# Patient Record
Sex: MALE | Race: BLACK OR AFRICAN AMERICAN | NOT HISPANIC OR LATINO | URBAN - METROPOLITAN AREA
[De-identification: names, ages, dates, MRNs, and addresses within clinical notes are randomized per-mention and may not be internally consistent; named-entity substitution may affect disease eponyms.]

---

## 2023-06-28 ENCOUNTER — OFFICE VISIT (OUTPATIENT)
Dept: URGENT CARE | Facility: URGENT CARE | Age: 33
End: 2023-06-28
Payer: COMMERCIAL

## 2023-06-28 VITALS
DIASTOLIC BLOOD PRESSURE: 89 MMHG | WEIGHT: 208 LBS | SYSTOLIC BLOOD PRESSURE: 130 MMHG | TEMPERATURE: 98 F | OXYGEN SATURATION: 98 % | RESPIRATION RATE: 17 BRPM | HEART RATE: 86 BPM

## 2023-06-28 DIAGNOSIS — J30.2 SEASONAL ALLERGIC RHINITIS, UNSPECIFIED TRIGGER: ICD-10-CM

## 2023-06-28 DIAGNOSIS — H10.503 BLEPHAROCONJUNCTIVITIS OF BOTH EYES, UNSPECIFIED BLEPHAROCONJUNCTIVITIS TYPE: Primary | ICD-10-CM

## 2023-06-28 PROCEDURE — 99203 OFFICE O/P NEW LOW 30 MIN: CPT | Performed by: EMERGENCY MEDICINE

## 2023-06-28 RX ORDER — OLOPATADINE HYDROCHLORIDE 1 MG/ML
1 SOLUTION/ DROPS OPHTHALMIC 2 TIMES DAILY
Qty: 1 ML | Refills: 0 | Status: SHIPPED | OUTPATIENT
Start: 2023-06-28 | End: 2023-07-05

## 2023-06-28 RX ORDER — QUETIAPINE FUMARATE 200 MG/1
TABLET, FILM COATED ORAL
COMMUNITY
Start: 2023-06-28

## 2023-06-28 RX ORDER — AZELASTINE 1 MG/ML
1 SPRAY, METERED NASAL 2 TIMES DAILY
Qty: 30 ML | Refills: 0 | Status: SHIPPED | OUTPATIENT
Start: 2023-06-28

## 2023-06-28 RX ORDER — FOLIC ACID/MV,IRON,MIN/LUTEIN 0.4-18-25
TABLET ORAL
COMMUNITY
Start: 2023-06-28

## 2023-06-28 RX ORDER — FOLIC ACID 1 MG/1
TABLET ORAL
COMMUNITY
Start: 2023-06-28

## 2023-06-28 RX ORDER — ERYTHROMYCIN 5 MG/G
0.5 OINTMENT OPHTHALMIC 4 TIMES DAILY
Qty: 3.5 G | Refills: 0 | Status: SHIPPED | OUTPATIENT
Start: 2023-06-28 | End: 2023-07-03

## 2023-06-28 RX ORDER — LANOLIN ALCOHOL/MO/W.PET/CERES
CREAM (GRAM) TOPICAL
COMMUNITY
Start: 2023-06-28

## 2023-06-28 RX ORDER — HYDROXYZINE HYDROCHLORIDE 50 MG/1
TABLET, FILM COATED ORAL
COMMUNITY
Start: 2023-06-28

## 2023-06-28 RX ORDER — PRAZOSIN HYDROCHLORIDE 2 MG/1
CAPSULE ORAL
COMMUNITY
Start: 2023-06-28

## 2023-06-29 NOTE — PROGRESS NOTES
Assessment & Plan     Diagnosis:    ICD-10-CM    1. Blepharoconjunctivitis of both eyes, unspecified blepharoconjunctivitis type  H10.503 olopatadine (PATANOL) 0.1 % ophthalmic solution     erythromycin (ROMYCIN) 5 MG/GM ophthalmic ointment      2. Seasonal allergic rhinitis, unspecified trigger  J30.2 azelastine (ASTELIN) 0.1 % nasal spray     olopatadine (PATANOL) 0.1 % ophthalmic solution            Medical Decision Making  Randall Ortiz is a 32 year old male who presents for evaluation of red eye itchy eyes and swollen eyelids.  A broad differential diagnosis was considered including bacterial conjunctivitis, viral conjunctivitis, foreign body, corneal abrasion, chemical vs allergic conjunctivitis, corneal ulcer, HSV, herpes zoster ophthalmicus, orbital cellulitis, etc.  Signs and symptoms consistent with a conjunctivitis, likely allergic given recent allergic reaction to Nasonex. Given amount of discharge from the eyes; will also cover with antibiotic ointment in case of bacterial superinfection, histamine eyedrops, given degree of nasal mucosal swelling also recommended Astelin nasal spray and have close follow-up of eye physician.  No red flag symptoms to suggest any of the above worrisome etiologies.      Patient voices understanding and agreement with the plan including reasons to go to the ER immediately as well as to be seen by a more consistent care-giver, such as their PCP, if the symptoms persist more than 3 days.       SAND[facility] Cardenas [facility]    Subjective     Randall Ortiz is a 32 year old male who presents to clinic today for the following health issues:  Chief Complaint   Patient presents with     Eye Problem     X 5 days - Bilateral eye       HPI    Eye Problem    Onset of symptoms was 5 day(s) ago.   Location: both eyes   Course of illness is improving.    Severity moderate  Current and Associated symptoms: redness, eyelid swelling, itching  Treatment  measures tried include flushed with water and ice packs.   Context: Has been in half-way, states that he was given Nasonex spray for sinus allergy symptoms and this caused an allergic reaction which caused his upper lip, nose and eyes to swell up.  He notes this has been improving over the last 5 days, but is still quite irritating.  He is waking up with a lot of crusting goop in his eyes.  No pain, vision changes, fevers, headaches, tongue or throat swelling, wheezing, difficulties breathing or swallowing.  Patient does not wear contact lenses.    Review of Systems    See HPI    Objective      Vitals: /89   Pulse 86   Temp 98  F (36.7  C) (Tympanic)   Resp 17   Wt 94.3 kg (208 lb)   SpO2 98%       Patient Vitals for the past 24 hrs:   BP Temp Temp src Pulse Resp SpO2 Weight   06/28/23 1309 130/89 98  F (36.7  C) Tympanic 86 17 98 % 94.3 kg (208 lb)       Vital signs reviewed by: Anjum Fernando PA-C    Physical Exam   Constitutional: Patient is alert. No acute distress.  Mouth: Mucous membranes are moist. Normal tongue and tonsil. Posterior oropharynx is clear.  Eyes: Conjunctivae is injected bilaterally; redness does not cross the limbus. There ismattering/discharge noted at the eyelid margins and at the medial canthi. Upper eyelids slightly swollen. No periorbital edema or erythema. EOMI are normal. PERRL. Visual acuity is intact.   Neurological: Alert. CN 2-7 intact.      Anjum Fernando PA-C, June 29, 2023